# Patient Record
Sex: MALE | Employment: STUDENT | URBAN - METROPOLITAN AREA
[De-identification: names, ages, dates, MRNs, and addresses within clinical notes are randomized per-mention and may not be internally consistent; named-entity substitution may affect disease eponyms.]

---

## 2024-10-30 DIAGNOSIS — S39.81XA SPORTS HERNIA, INITIAL ENCOUNTER: Primary | ICD-10-CM

## 2024-10-30 DIAGNOSIS — S39.011A ABDOMINAL MUSCLE STRAIN, INITIAL ENCOUNTER: Primary | ICD-10-CM

## 2024-10-31 DIAGNOSIS — S39.011A STRAIN OF RECTUS ABDOMINIS MUSCLE, INITIAL ENCOUNTER: ICD-10-CM

## 2024-11-08 ENCOUNTER — HOSPITAL ENCOUNTER (OUTPATIENT)
Dept: RADIOLOGY | Facility: HOSPITAL | Age: 19
Discharge: HOME | End: 2024-11-08
Payer: COMMERCIAL

## 2024-11-08 DIAGNOSIS — S39.011A ABDOMINAL MUSCLE STRAIN, INITIAL ENCOUNTER: ICD-10-CM

## 2024-11-08 PROCEDURE — 72195 MRI PELVIS W/O DYE: CPT

## 2024-11-15 ENCOUNTER — APPOINTMENT (OUTPATIENT)
Dept: RADIOLOGY | Facility: HOSPITAL | Age: 19
End: 2024-11-15
Payer: COMMERCIAL

## 2024-11-18 ENCOUNTER — APPOINTMENT (OUTPATIENT)
Dept: RADIOLOGY | Facility: HOSPITAL | Age: 19
End: 2024-11-18
Payer: COMMERCIAL

## 2025-04-20 ENCOUNTER — OFFICE VISIT (OUTPATIENT)
Dept: URGENT CARE | Age: 20
End: 2025-04-20
Payer: COMMERCIAL

## 2025-04-20 ENCOUNTER — PHARMACY VISIT (OUTPATIENT)
Dept: PHARMACY | Facility: CLINIC | Age: 20
End: 2025-04-20
Payer: COMMERCIAL

## 2025-04-20 VITALS
SYSTOLIC BLOOD PRESSURE: 138 MMHG | HEIGHT: 75 IN | HEART RATE: 69 BPM | DIASTOLIC BLOOD PRESSURE: 78 MMHG | TEMPERATURE: 98.2 F | WEIGHT: 160 LBS | OXYGEN SATURATION: 96 % | RESPIRATION RATE: 18 BRPM | BODY MASS INDEX: 19.89 KG/M2

## 2025-04-20 DIAGNOSIS — J40 BRONCHITIS: Primary | ICD-10-CM

## 2025-04-20 PROCEDURE — 3008F BODY MASS INDEX DOCD: CPT | Performed by: PHYSICIAN ASSISTANT

## 2025-04-20 PROCEDURE — RXMED WILLOW AMBULATORY MEDICATION CHARGE

## 2025-04-20 PROCEDURE — 1036F TOBACCO NON-USER: CPT | Performed by: PHYSICIAN ASSISTANT

## 2025-04-20 PROCEDURE — 99203 OFFICE O/P NEW LOW 30 MIN: CPT | Performed by: PHYSICIAN ASSISTANT

## 2025-04-20 RX ORDER — FLUTICASONE PROPIONATE 50 MCG
1 SPRAY, SUSPENSION (ML) NASAL AS NEEDED
COMMUNITY

## 2025-04-20 RX ORDER — PREDNISONE 20 MG/1
40 TABLET ORAL DAILY
Qty: 10 TABLET | Refills: 0 | Status: SHIPPED | OUTPATIENT
Start: 2025-04-20 | End: 2025-04-25

## 2025-04-20 RX ORDER — DOXYCYCLINE HYCLATE 100 MG
100 TABLET ORAL 2 TIMES DAILY
Qty: 20 TABLET | Refills: 0 | Status: SHIPPED | OUTPATIENT
Start: 2025-04-20 | End: 2025-04-30

## 2025-04-20 ASSESSMENT — ENCOUNTER SYMPTOMS
COUGH: 1
SORE THROAT: 1
FEVER: 1
TROUBLE SWALLOWING: 0

## 2025-04-20 NOTE — PROGRESS NOTES
"Subjective   Patient ID: Juan Parnell is a 19 y.o. male. They present today with a chief complaint of Cough (Symptoms for 3 weeks. Within the last 4 days, he has had yellow and green mucus with a slight fever. Hard to swallow this morning. ).    History of Present Illness  Patient is a 19 year old male presenting for evaluation of a cough. Symptoms started about 3 weeks ago but after the last 4 days have worsened. Initially cough was dry, was seen by provider at [facility] and was prescribed cough medicine. Symptoms persisted but now he reports he is running a fever and has nasal congestion and coughing up green/yellow mucous. 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  History of sinus infections in the past that have felt similar.       History provided by:  Patient   used: No    Cough  Associated symptoms include a fever and a sore throat.       Past Medical History  Allergies as of 04/20/2025    (No Known Allergies)       Prescriptions Prior to Admission[1]     Medical History[2]    Surgical History[3]     reports that he has never smoked. He has never been exposed to tobacco smoke. He has never used smokeless tobacco. He reports that he does not currently use alcohol. He reports that he does not use drugs.    Review of Systems  Review of Systems   Constitutional:  Positive for fever.   HENT:  Positive for congestion and sore throat. Negative for trouble swallowing.    Respiratory:  Positive for cough.                                   Objective    Vitals:    04/20/25 1115   BP: 138/78   BP Location: Left arm   Patient Position: Sitting   BP Cuff Size: Adult   Pulse: 69   Resp: 18   Temp: 36.8 °C (98.2 °F)   TempSrc: Oral   SpO2: 96%   Weight: 72.6 kg (160 lb)   Height: 1.905 m (6' 3\")     No LMP for male patient.    Physical Exam  Constitutional:       General: He is not in acute distress.     Appearance: Normal appearance. He is normal weight. He is not " ill-appearing or toxic-appearing.   HENT:      Head: Normocephalic. No right periorbital erythema or left periorbital erythema.      Jaw: There is normal jaw occlusion. No trismus.      Right Ear: Tympanic membrane, ear canal and external ear normal. There is no impacted cerumen.      Left Ear: Tympanic membrane, ear canal and external ear normal. There is no impacted cerumen.      Mouth/Throat:      Mouth: Mucous membranes are moist.      Pharynx: No oropharyngeal exudate or posterior oropharyngeal erythema.   Eyes:      General: No scleral icterus.        Right eye: No discharge.         Left eye: No discharge.      Conjunctiva/sclera: Conjunctivae normal.   Neck:      Trachea: Phonation normal.   Cardiovascular:      Rate and Rhythm: Normal rate and regular rhythm.      Heart sounds: Normal heart sounds. No murmur heard.     No friction rub. No gallop.   Pulmonary:      Effort: Pulmonary effort is normal. No respiratory distress.      Breath sounds: No stridor. Rhonchi present. No wheezing or rales.   Lymphadenopathy:      Cervical: Cervical adenopathy present.   Neurological:      General: No focal deficit present.      Mental Status: He is alert.      Gait: Gait normal.   Psychiatric:         Mood and Affect: Mood normal.         Behavior: Behavior normal.         Thought Content: Thought content normal.         Procedures    Point of Care Test & Imaging Results from this visit  No results found for this visit on 04/20/25.   Imaging  No results found.    Cardiology, Vascular, and Other Imaging  No other imaging results found for the past 2 days      Diagnostic study results (if any) were reviewed by Belkys Dubois PA-C.    Assessment/Plan   Allergies, medications, history, and pertinent labs/EKGs/Imaging reviewed by Belkys Dubois PA-C.     Medical Decision Making  Patient is a 19 year old male presenting for a cough and sinus issues. Hemodynamically stable. Nontoxic appearing, no meningismus. Posterior  oropharynx clear, no exudates or vesicular lesions. Uvula is midline and there is no asymmetrical swelling of tonsils, drooling, trismus or muffled voice to suggest RPA or PTA. TM without erythema or bulging to suggest otitis media. Lungs with rhonchi but otherwise clear. Given duration of symptoms, purulent sputum now with fever as well will treat for bacterial infection. Suspect bronchitis and sinusitis given history and exam.  Discussed supportive care, OTC medications as needed, tylenol/ibuprofen for pain or fever, rest, fluids.     At time of discharge, patient was clinically well-appearing and appropriate for outpatient management. The patient/parent/guardian was educated regarding diagnosis, supportive care, OTC and Rx medications. The patient/parent/guardian was given the opportunity to ask questions prior to discharge. They verbalized understanding of discussion of treatment plan, expected course of illness and/or injury, indications on when to return to , when to seek further evaluation in ED/call 911, and the need to follow up with PCP and/or specialist as referred. Patient/parent/guardian was provided with work/school documentation if requested. Patient stable upon discharge.     Orders and Diagnoses  Diagnoses and all orders for this visit:  Bronchitis  -     doxycycline (Vibra-Tabs) 100 mg tablet; Take 1 tablet (100 mg) by mouth 2 times a day for 10 days. Take with a full glass of water and do not lie down for at least 30 minutes after.  -     predniSONE (Deltasone) 20 mg tablet; Take 2 tablets (40 mg) by mouth once daily for 5 days.      Medical Admin Record      Patient disposition: Home    Electronically signed by Belkys Dubois PA-C  11:35 AM           [1] (Not in a hospital admission)   [2] No past medical history on file.  [3] No past surgical history on file.